# Patient Record
Sex: MALE | Race: WHITE
[De-identification: names, ages, dates, MRNs, and addresses within clinical notes are randomized per-mention and may not be internally consistent; named-entity substitution may affect disease eponyms.]

---

## 2019-01-14 ENCOUNTER — HOSPITAL ENCOUNTER (OUTPATIENT)
Dept: HOSPITAL 53 - M LRY | Age: 44
End: 2019-01-14
Attending: PHYSICIAN ASSISTANT
Payer: COMMERCIAL

## 2019-01-14 DIAGNOSIS — M54.41: Primary | ICD-10-CM

## 2019-01-14 DIAGNOSIS — Z53.8: ICD-10-CM

## 2019-01-17 ENCOUNTER — HOSPITAL ENCOUNTER (OUTPATIENT)
Dept: HOSPITAL 53 - M LRY | Age: 44
End: 2019-01-17
Attending: PHYSICIAN ASSISTANT
Payer: COMMERCIAL

## 2019-01-17 DIAGNOSIS — M54.41: Primary | ICD-10-CM

## 2019-01-17 DIAGNOSIS — M51.36: ICD-10-CM

## 2019-01-17 NOTE — REP
Lumbar spine series:  Five views.

 

History:  Lumbago with sciatica on the right.  No comparison radiographs.

 

Findings:  Lumbar vertebral body heights are preserved.  Alignment is normal.

There is mild disc space narrowing at L4-5 with discogenic spurring anteriorly.

Pedicles and posterior elements are intact.  There is no evidence of

spondylolysis or spondylolisthesis.  Psoas margins are symmetric.  Sacrum and SI

joints are intact.

 

Impression:

 

Mild degenerative disc changes L4-5.  Otherwise negative radiographs of the

lumbar spine.

 

 

Electronically Signed by

Sebastian Clark MD 01/17/2019 08:49 A

## 2019-04-08 ENCOUNTER — HOSPITAL ENCOUNTER (OUTPATIENT)
Dept: HOSPITAL 53 - M SFHCLERA | Age: 44
End: 2019-04-08
Attending: FAMILY MEDICINE
Payer: COMMERCIAL

## 2019-04-08 LAB
CHOLEST SERPL-MCNC: 240 MG/DL (ref ?–200)
CHOLEST/HDLC SERPL: 4.14 {RATIO} (ref ?–5)
EST. AVERAGE GLUCOSE BLD GHB EST-MCNC: 97 MG/DL (ref 60–110)
HDLC SERPL-MCNC: 58 MG/DL (ref 40–?)
LDLC SERPL CALC-MCNC: 152 MG/DL (ref ?–100)
NONHDLC SERPL-MCNC: 182 MG/DL
TRIGL SERPL-MCNC: 150 MG/DL (ref ?–150)

## 2019-08-05 ENCOUNTER — HOSPITAL ENCOUNTER (EMERGENCY)
Dept: HOSPITAL 53 - M ED | Age: 44
LOS: 1 days | Discharge: HOME | End: 2019-08-06
Payer: COMMERCIAL

## 2019-08-05 VITALS — DIASTOLIC BLOOD PRESSURE: 86 MMHG | SYSTOLIC BLOOD PRESSURE: 146 MMHG

## 2019-08-05 VITALS — BODY MASS INDEX: 27.9 KG/M2 | HEIGHT: 69 IN | WEIGHT: 188.39 LBS

## 2019-08-05 DIAGNOSIS — M54.40: ICD-10-CM

## 2019-08-05 DIAGNOSIS — M51.9: ICD-10-CM

## 2019-08-05 DIAGNOSIS — Z79.899: ICD-10-CM

## 2019-08-05 DIAGNOSIS — K57.32: Primary | ICD-10-CM

## 2019-08-05 LAB
ALBUMIN SERPL BCG-MCNC: 3.8 GM/DL (ref 3.2–5.2)
ALT SERPL W P-5'-P-CCNC: 37 U/L (ref 12–78)
BASOPHILS # BLD AUTO: 0.1 10^3/UL (ref 0–0.2)
BASOPHILS NFR BLD AUTO: 0.6 % (ref 0–1)
BILIRUB CONJ SERPL-MCNC: 0.2 MG/DL (ref 0–0.2)
BILIRUB SERPL-MCNC: 0.6 MG/DL (ref 0.2–1)
BUN SERPL-MCNC: 26 MG/DL (ref 7–18)
CALCIUM SERPL-MCNC: 8.7 MG/DL (ref 8.5–10.1)
CHLORIDE SERPL-SCNC: 105 MEQ/L (ref 98–107)
CO2 SERPL-SCNC: 32 MEQ/L (ref 21–32)
CREAT SERPL-MCNC: 1.19 MG/DL (ref 0.7–1.3)
EOSINOPHIL # BLD AUTO: 0.3 10^3/UL (ref 0–0.5)
EOSINOPHIL NFR BLD AUTO: 2.6 % (ref 0–3)
GFR SERPL CREATININE-BSD FRML MDRD: > 60 ML/MIN/{1.73_M2} (ref 60–?)
GLUCOSE SERPL-MCNC: 98 MG/DL (ref 70–100)
HCT VFR BLD AUTO: 40.1 % (ref 42–52)
HGB BLD-MCNC: 13.3 G/DL (ref 13.5–17.5)
LIPASE SERPL-CCNC: 141 U/L (ref 73–393)
LYMPHOCYTES # BLD AUTO: 2.4 10^3/UL (ref 1.5–4.5)
LYMPHOCYTES NFR BLD AUTO: 23.6 % (ref 24–44)
MCH RBC QN AUTO: 32.4 PG (ref 27–33)
MCHC RBC AUTO-ENTMCNC: 33.2 G/DL (ref 32–36.5)
MCV RBC AUTO: 97.8 FL (ref 80–96)
MONOCYTES # BLD AUTO: 0.7 10^3/UL (ref 0–0.8)
MONOCYTES NFR BLD AUTO: 6.9 % (ref 0–5)
NEUTROPHILS # BLD AUTO: 6.7 10^3/UL (ref 1.8–7.7)
NEUTROPHILS NFR BLD AUTO: 66 % (ref 36–66)
PLATELET # BLD AUTO: 214 10^3/UL (ref 150–450)
POTASSIUM SERPL-SCNC: 4.2 MEQ/L (ref 3.5–5.1)
PROT SERPL-MCNC: 7.4 GM/DL (ref 6.4–8.2)
RBC # BLD AUTO: 4.1 10^6/UL (ref 4.3–6.1)
SODIUM SERPL-SCNC: 140 MEQ/L (ref 136–145)
WBC # BLD AUTO: 10.1 10^3/UL (ref 4–10)

## 2019-08-05 PROCEDURE — 99284 EMERGENCY DEPT VISIT MOD MDM: CPT

## 2019-08-05 PROCEDURE — 85025 COMPLETE CBC W/AUTO DIFF WBC: CPT

## 2019-08-05 PROCEDURE — 80076 HEPATIC FUNCTION PANEL: CPT

## 2019-08-05 PROCEDURE — 80048 BASIC METABOLIC PNL TOTAL CA: CPT

## 2019-08-05 PROCEDURE — 74177 CT ABD & PELVIS W/CONTRAST: CPT

## 2019-08-05 PROCEDURE — 83690 ASSAY OF LIPASE: CPT

## 2019-08-05 PROCEDURE — 81001 URINALYSIS AUTO W/SCOPE: CPT

## 2019-08-05 NOTE — REPVR
EXAM: 

CT Abdomen and Pelvis With Contrast 



EXAM DATE/TIME: 

8/5/2019 10:40 PM 



CLINICAL HISTORY: 

44 years old, male; Abdominal pain; Localized; Left lower quadrant (llq); 

Additional info: Llq pain, RO diverticulitis 



TECHNIQUE: 

Imaging protocol: Axial computed tomography images of the abdomen and pelvis 

with intravenous contrast. Coronal and sagittal reformatted images were created 

and reviewed. 

Radiation optimization: All CT scans at this facility use at least one of these 

dose optimization techniques: automated exposure control; mA and/or kV 

adjustment per patient size (includes targeted exams where dose is matched to 

clinical indication); or iterative reconstruction. 

Contrast material: ISOVUE 370;Contrast volume: 100 ml;Contrast route: IV; 



COMPARISON: 

No relevant prior studies available. 



FINDINGS: 



Liver: There is a diffuse decrease in hepatic parenchymal density, consistent 

with fatty infiltration. 

Gallbladder and bile ducts: The gallbladder is incompletely distended. This is 

most likely related to incomplete fasting. Clinical correlation to exclude 

gallbladder pathology suggested. 

Pancreas: Normal. No ductal dilation. 

Spleen: Normal. No splenomegaly. 

Adrenals: Normal. No mass. 

Kidneys and ureters: Normal. No hydronephrosis. 

Stomach and bowel: Normal. No obstruction. No mucosal thickening. 

Appendix: No evidence of appendicitis. 

Intraperitoneal space: There is a segment of acute inflammation in the distal 

ascending colon which demonstrates wall thickening and luminal narrowing 

without evidence of a drainable abscess or free air, consistent with acute 

diverticulitis. Minimal fluid is demonstrated in the lower left paracolic 

gutter. 

Vasculature: Normal. No abdominal aortic aneurysm. 

Lymph nodes: Normal. No enlarged lymph nodes. 



Bladder: Unremarkable as visualized. 

Reproductive: The prostate gland demonstrates mild hyperplasia. 

Bones/joints: No acute fracture. No dislocation. 

Soft tissues: Small right inguinal hernia 



IMPRESSION: 

1. There is a diffuse decrease in hepatic parenchymal density, consistent with 

fatty infiltration. 

2. The gallbladder is incompletely distended. This is most likely related to 

incomplete fasting. Clinical correlation to exclude gallbladder pathology 

suggested. 

3. There is a segment of acute inflammation in the distal ascending colon which 

demonstrates wall thickening and luminal narrowing without evidence of a 

drainable abscess or free air, consistent with acute diverticulitis. Minimal 

fluid is demonstrated in the lower left paracolic gutter. 

4. Mild prostatic hyperplasia. 



Electronically signed by: Robin Su On 08/05/2019  23:20:59 PM

## 2020-01-21 ENCOUNTER — HOSPITAL ENCOUNTER (OUTPATIENT)
Dept: HOSPITAL 53 - M SDC | Age: 45
Discharge: HOME | End: 2020-01-21
Attending: SURGERY
Payer: COMMERCIAL

## 2020-01-21 VITALS — SYSTOLIC BLOOD PRESSURE: 136 MMHG | DIASTOLIC BLOOD PRESSURE: 78 MMHG

## 2020-01-21 VITALS — HEIGHT: 69 IN | WEIGHT: 195 LBS | BODY MASS INDEX: 28.88 KG/M2

## 2020-01-21 DIAGNOSIS — K42.9: Primary | ICD-10-CM

## 2020-01-21 PROCEDURE — 49585: CPT

## 2020-01-21 PROCEDURE — 88302 TISSUE EXAM BY PATHOLOGIST: CPT

## 2020-01-21 NOTE — RO
DATE OF PROCEDURE:  01/21/2020

 

PREOPERATIVE DIAGNOSIS:  Umbilical hernia.

 

POSTOPERATIVE DIAGNOSIS:  Umbilical hernia.

 

PROCEDURE:  Open umbilical hernia repair.

 

SURGEON:  Dr. Leo Gosselin

 

ASSISTANT:

 

ANESTHESIA:  General endotracheal anesthesia.

 

ESTIMATED BLOOD LOSS:  Minimal.

 

FLUIDS:  crystalloid.

 

BRIEF PROCEDURE SUMMARY:  The patient was brought to the operating room and was

given general anesthesia.  After adequate anesthesia and preoperative antibiotics

were given, the patient was prepped and draped in the usual sterile fashion.

Next, a supraumbilical incision was made with skin knife.  Blunt dissection was

carried down to the hernia sac and fascia.  The hernia sac was isolated at the

level of the fascia and the hernia sac was transected off the posterior aspect of

the umbilicus.  Next, the hernia sac was entered and there was preperitoneal fat

that was coming through the hernia sac itself, no omentum, no bowel.  This was

transected at the level of the fascia after mobilizing this a little bit more to

take this off the backside of the fascia.  In any case, a Kocher was placed on

both sides of this hernia/fascial defect which was about a centimeter in size and

this was closed with two figure-of-eight 0 Ethibond sutures.  The base of the

umbilicus was tacked to the fascia with #3-0 Vicryl and #3-0 Vicryl was used to

approximate the dermis and #4-0 Vicryl was used to approximate the skin.

Steri-Strips and a dry sterile dressing was applied.  The patient was awakened

from his anesthesia, extubated, and brought to the recovery room awake, alert and

hemodynamically stable.  Sponge and needle counts were correct times two.

## 2020-01-28 ENCOUNTER — HOSPITAL ENCOUNTER (OUTPATIENT)
Dept: HOSPITAL 53 - M LAB REF | Age: 45
End: 2020-01-28
Attending: DERMATOLOGY
Payer: COMMERCIAL

## 2020-01-28 DIAGNOSIS — D48.9: Primary | ICD-10-CM

## 2020-02-21 ENCOUNTER — HOSPITAL ENCOUNTER (OUTPATIENT)
Dept: HOSPITAL 53 - M OPP | Age: 45
Discharge: HOME | End: 2020-02-21
Attending: INTERNAL MEDICINE
Payer: COMMERCIAL

## 2020-02-21 VITALS — HEIGHT: 69 IN | WEIGHT: 186 LBS | BODY MASS INDEX: 27.55 KG/M2

## 2020-02-21 VITALS — DIASTOLIC BLOOD PRESSURE: 97 MMHG | SYSTOLIC BLOOD PRESSURE: 137 MMHG

## 2020-02-21 DIAGNOSIS — Z80.0: ICD-10-CM

## 2020-02-21 DIAGNOSIS — K57.32: ICD-10-CM

## 2020-02-21 DIAGNOSIS — K64.8: Primary | ICD-10-CM

## 2020-02-21 DIAGNOSIS — Z79.2: ICD-10-CM

## 2020-02-21 DIAGNOSIS — K57.30: ICD-10-CM

## 2020-02-21 NOTE — ROOR
________________________________________________________________________________

Patient Name: Bartolome Enrique         Procedure Date: 2/21/2020 8:27 AM

MRN: [id number]                          Account Number: [id number]

YOB: 1975               Age: 44

Room: [facility]                            Gender: Male

Note Status: Finalized                 

________________________________________________________________________________

 

Procedure:           Colonoscopy

Indications:         Family history of colon cancer in a first-degree 

                     relative, Follow-up of diverticulitis

Providers:           Dustin NINO MD

Referring MD:        Luis VANG MD

Requesting Provider: 

Medicines:           Monitored Anesthesia Care

Complications:       No immediate complications.

________________________________________________________________________________

Procedure:           Pre-Anesthesia Assessment:

                     - The heart rate, respiratory rate, oxygen saturations, 

                     blood pressure, adequacy of pulmonary ventilation, and 

                     response to care were monitored throughout the procedure.

                     The Colonoscope was introduced through the anus and 

                     advanced to the terminal ileum, with identification of 

                     the appendiceal orifice and IC valve. The colonoscopy was 

                     performed without difficulty. The patient tolerated the 

                     procedure well. The quality of the bowel preparation was 

                     good.

                                                                                

Findings:

     The perianal and digital rectal examinations were normal.

     Scattered small-mouthed diverticula were found in the sigmoid colon.

     Small Internal Hemorrhoids.

     The exam was otherwise without abnormality on direct and retroflexion 

     views.

                                                                                

Impression:          - Mild diverticulosis in the sigmoid colon.

                     - Small Internal Hemorrhoids.

                     - The examination was otherwise normal on direct and 

                     retroflexion views.

                     - No specimens collected.

Recommendation:      - Repeat colonoscopy in 5 years for screening purposes.

                                                                                

 

Dustin Nino MD

________________

Dustin NINO MD

2/21/2020 8:45:52 AM

Electronically signed by Dustin NINO MD

Number of Addenda: 0

 

Note Initiated On: 2/21/2020 8:27 AM

Estimated Blood Loss:

     Estimated blood loss: none.

## 2022-05-18 ENCOUNTER — HOSPITAL ENCOUNTER (OUTPATIENT)
Dept: HOSPITAL 53 - M LAB | Age: 47
End: 2022-05-18
Attending: STUDENT IN AN ORGANIZED HEALTH CARE EDUCATION/TRAINING PROGRAM
Payer: COMMERCIAL

## 2022-05-18 ENCOUNTER — HOSPITAL ENCOUNTER (EMERGENCY)
Dept: HOSPITAL 53 - M ED | Age: 47
Discharge: HOME | End: 2022-05-18
Payer: COMMERCIAL

## 2022-05-18 VITALS — DIASTOLIC BLOOD PRESSURE: 99 MMHG | SYSTOLIC BLOOD PRESSURE: 193 MMHG

## 2022-05-18 VITALS — BODY MASS INDEX: 27.69 KG/M2 | HEIGHT: 69 IN | WEIGHT: 186.95 LBS

## 2022-05-18 DIAGNOSIS — K57.92: ICD-10-CM

## 2022-05-18 DIAGNOSIS — R11.2: Primary | ICD-10-CM

## 2022-05-18 DIAGNOSIS — Z79.899: ICD-10-CM

## 2022-05-18 DIAGNOSIS — F10.10: ICD-10-CM

## 2022-05-18 DIAGNOSIS — A04.72: ICD-10-CM

## 2022-05-18 LAB
ALBUMIN SERPL BCG-MCNC: 3.9 GM/DL (ref 3.2–5.2)
ALT SERPL W P-5'-P-CCNC: 25 U/L (ref 12–78)
BASOPHILS # BLD AUTO: 0.1 10^3/UL (ref 0–0.2)
BASOPHILS NFR BLD AUTO: 0.6 % (ref 0–1)
BILIRUB SERPL-MCNC: 1.1 MG/DL (ref 0.2–1)
BUN SERPL-MCNC: 24 MG/DL (ref 7–18)
CALCIUM SERPL-MCNC: 9.8 MG/DL (ref 8.5–10.1)
CHLORIDE SERPL-SCNC: 101 MEQ/L (ref 98–107)
CO2 SERPL-SCNC: 26 MEQ/L (ref 21–32)
CREAT SERPL-MCNC: 1.05 MG/DL (ref 0.7–1.3)
EOSINOPHIL # BLD AUTO: 0.1 10^3/UL (ref 0–0.5)
EOSINOPHIL NFR BLD AUTO: 0.6 % (ref 0–3)
GFR SERPL CREATININE-BSD FRML MDRD: > 60 ML/MIN/{1.73_M2} (ref 60–?)
GLUCOSE SERPL-MCNC: 112 MG/DL (ref 70–100)
HCT VFR BLD AUTO: 46.5 % (ref 42–52)
HGB BLD-MCNC: 15.9 G/DL (ref 13.5–17.5)
LYMPHOCYTES # BLD AUTO: 2.2 10^3/UL (ref 1.5–5)
LYMPHOCYTES NFR BLD AUTO: 20.5 % (ref 24–44)
MCH RBC QN AUTO: 31.5 PG (ref 27–33)
MCHC RBC AUTO-ENTMCNC: 34.2 G/DL (ref 32–36.5)
MCV RBC AUTO: 92.1 FL (ref 80–96)
MONOCYTES # BLD AUTO: 1.1 10^3/UL (ref 0–0.8)
MONOCYTES NFR BLD AUTO: 10.2 % (ref 2–8)
NEUTROPHILS # BLD AUTO: 7.2 10^3/UL (ref 1.5–8.5)
NEUTROPHILS NFR BLD AUTO: 67.2 % (ref 36–66)
PLATELET # BLD AUTO: 372 10^3/UL (ref 150–450)
POTASSIUM SERPL-SCNC: 4.3 MEQ/L (ref 3.5–5.1)
PROT SERPL-MCNC: 8.1 GM/DL (ref 6.4–8.2)
RBC # BLD AUTO: 5.05 10^6/UL (ref 4.3–6.1)
SODIUM SERPL-SCNC: 137 MEQ/L (ref 136–145)
WBC # BLD AUTO: 10.8 10^3/UL (ref 4–10)

## 2022-05-18 PROCEDURE — 96374 THER/PROPH/DIAG INJ IV PUSH: CPT

## 2022-05-18 PROCEDURE — 74177 CT ABD & PELVIS W/CONTRAST: CPT

## 2022-05-18 PROCEDURE — 87507 IADNA-DNA/RNA PROBE TQ 12-25: CPT

## 2022-05-18 PROCEDURE — 99283 EMERGENCY DEPT VISIT LOW MDM: CPT

## 2022-05-18 PROCEDURE — 96361 HYDRATE IV INFUSION ADD-ON: CPT

## 2022-09-23 ENCOUNTER — HOSPITAL ENCOUNTER (OUTPATIENT)
Dept: HOSPITAL 53 - M LAB | Age: 47
End: 2022-09-23
Attending: PHYSICIAN ASSISTANT
Payer: COMMERCIAL

## 2022-09-23 DIAGNOSIS — E78.00: Primary | ICD-10-CM

## 2022-09-23 DIAGNOSIS — R53.83: ICD-10-CM

## 2022-09-23 LAB
25(OH)D3 SERPL-MCNC: 36.2 NG/ML (ref 30–100)
ALBUMIN SERPL BCG-MCNC: 4.3 GM/DL (ref 3.2–5.2)
ALT SERPL W P-5'-P-CCNC: 55 U/L (ref 12–78)
APPEARANCE UR: CLEAR
BACTERIA URNS QL MICRO: (no result)
BASOPHILS # BLD AUTO: 0.1 10^3/UL (ref 0–0.2)
BASOPHILS NFR BLD AUTO: 1.1 % (ref 0–1)
BILIRUB SERPL-MCNC: 1.2 MG/DL (ref 0.2–1)
BILIRUB UR QL STRIP: NEGATIVE
BUN SERPL-MCNC: 22 MG/DL (ref 7–18)
CALCIUM SERPL-MCNC: 9.6 MG/DL (ref 8.5–10.1)
CHLORIDE SERPL-SCNC: 101 MEQ/L (ref 98–107)
CHOLEST SERPL-MCNC: 252 MG/DL (ref ?–200)
CHOLEST/HDLC SERPL: 3.55 {RATIO} (ref ?–5)
CO2 SERPL-SCNC: 30 MEQ/L (ref 21–32)
COLOR UR: YELLOW
CREAT SERPL-MCNC: 0.92 MG/DL (ref 0.7–1.3)
EOSINOPHIL # BLD AUTO: 0.1 10^3/UL (ref 0–0.5)
EOSINOPHIL NFR BLD AUTO: 2.5 % (ref 0–3)
GFR SERPL CREATININE-BSD FRML MDRD: > 60 ML/MIN/{1.73_M2} (ref 60–?)
GLUCOSE SERPL-MCNC: 98 MG/DL (ref 70–100)
GLUCOSE UR STRIP-MCNC: NEGATIVE MG/DL
HCT VFR BLD AUTO: 46.6 % (ref 42–52)
HDLC SERPL-MCNC: 71 MG/DL (ref 40–?)
HGB BLD-MCNC: 15.4 G/DL (ref 13.5–17.5)
HGB UR QL STRIP: (no result)
HYALINE CASTS URNS QL MICRO: (no result) /LPF (ref 0–1)
KETONES UR QL STRIP: NEGATIVE MG/DL
LDLC SERPL CALC-MCNC: 159 MG/DL (ref ?–100)
LEUKOCYTE ESTERASE UR QL STRIP: NEGATIVE
LYMPHOCYTES # BLD AUTO: 2 10^3/UL (ref 1.5–5)
LYMPHOCYTES NFR BLD AUTO: 36.5 % (ref 24–44)
MCH RBC QN AUTO: 31.5 PG (ref 27–33)
MCHC RBC AUTO-ENTMCNC: 33 G/DL (ref 32–36.5)
MCV RBC AUTO: 95.3 FL (ref 80–96)
MONOCYTES # BLD AUTO: 0.5 10^3/UL (ref 0–0.8)
MONOCYTES NFR BLD AUTO: 9.7 % (ref 2–8)
NEUTROPHILS # BLD AUTO: 2.8 10^3/UL (ref 1.5–8.5)
NEUTROPHILS NFR BLD AUTO: 50 % (ref 36–66)
NITRITE UR QL STRIP: NEGATIVE
NONHDLC SERPL-MCNC: 181 MG/DL
PH UR STRIP: 5 UNITS (ref 5–7)
PLATELET # BLD AUTO: 265 10^3/UL (ref 150–450)
POTASSIUM SERPL-SCNC: 4.5 MEQ/L (ref 3.5–5.1)
PROT SERPL-MCNC: 8.3 GM/DL (ref 6.4–8.2)
PROT UR STRIP-MCNC: NEGATIVE MG/DL
RBC # BLD AUTO: 4.89 10^6/UL (ref 4.3–6.1)
RBC #/AREA URNS HPF: (no result) /HPF (ref 0–3)
SODIUM SERPL-SCNC: 133 MEQ/L (ref 136–145)
SP GR UR STRIP: 1.02 (ref 1–1.03)
SQUAMOUS URNS QL MICRO: (no result) /HPF
TRIGL SERPL-MCNC: 111 MG/DL (ref ?–150)
TSH SERPL DL<=0.005 MIU/L-ACNC: 1.1 UIU/ML (ref 0.36–3.74)
UROBILINOGEN UR QL STRIP: NORMAL MG/DL
WBC # BLD AUTO: 5.6 10^3/UL (ref 4–10)
WBC #/AREA URNS HPF: (no result) /HPF (ref 0–3)

## 2022-09-24 LAB — VIT B12 SERPL-MCNC: 496 PG/ML (ref 247–911)

## 2023-03-08 ENCOUNTER — HOSPITAL ENCOUNTER (OUTPATIENT)
Dept: HOSPITAL 53 - M SFHCDERM | Age: 48
End: 2023-03-08
Attending: PHYSICIAN ASSISTANT
Payer: COMMERCIAL

## 2023-03-08 DIAGNOSIS — L43.9: Primary | ICD-10-CM
